# Patient Record
Sex: FEMALE
[De-identification: names, ages, dates, MRNs, and addresses within clinical notes are randomized per-mention and may not be internally consistent; named-entity substitution may affect disease eponyms.]

---

## 2023-03-04 ENCOUNTER — NURSE TRIAGE (OUTPATIENT)
Dept: OTHER | Facility: CLINIC | Age: 43
End: 2023-03-04

## 2023-03-04 NOTE — TELEPHONE ENCOUNTER
Location of patient: [place]    Subjective: Caller states \"My eyelid is swollen and it hurts. It could be a stye, right on my lash line. \"     Current Symptoms: stye-right eye upper lid    Onset: 1 day ago; sudden    Associated Symptoms: NA    Pain Severity: 7/10; sore; constant    Temperature: caller denies by unknown method    What has been tried: warm compress and stye ointment     LMP:  a week ago  Pregnant: No    Recommended disposition: See PCP within 24 Hours    Care advice provided, patient verbalizes understanding; denies any other questions or concerns; instructed to call back for any new or worsening symptoms. Patient/caller agrees to follow-up with PCP     This triage is a result of a call to [address] [place] [address]. Please do not respond to the triage nurse through this encounter. Any subsequent communication should be directly with the patient. Caller has health insurance questions. Number shared for [facility] representative:  [phone]. Call soft transferred to customer service.      Soft transfer to [facility]       Reason for Disposition   [1] Eyelid is very swollen AND [2] no fever    Protocols used: Sty-ADULT-